# Patient Record
(demographics unavailable — no encounter records)

---

## 2025-07-23 NOTE — HEALTH RISK ASSESSMENT
[Good] : ~his/her~  mood as  good [No] : No [No falls in past year] : Patient reported no falls in the past year [0] : 2) Feeling down, depressed, or hopeless: Not at all (0) [Never] : Never [Patient reported mammogram was normal] : Patient reported mammogram was normal [With Significant Other] : lives with significant other [# of Members in Household ___] :  household currently consist of [unfilled] member(s) [Employed] : employed [Graduate School] : graduate school [] :  [# Of Children ___] : has [unfilled] children [Fully functional (bathing, dressing, toileting, transferring, walking, feeding)] : Fully functional (bathing, dressing, toileting, transferring, walking, feeding) [Fully functional (using the telephone, shopping, preparing meals, housekeeping, doing laundry, using] : Fully functional and needs no help or supervision to perform IADLs (using the telephone, shopping, preparing meals, housekeeping, doing laundry, using transportation, managing medications and managing finances) [Smoke Detector] : smoke detector [Carbon Monoxide Detector] : carbon monoxide detector [Seat Belt] :  uses seat belt [Sunscreen] : uses sunscreen [Patient reported PAP Smear was normal] : Patient reported PAP Smear was normal [Patient reported bone density results were normal] : Patient reported bone density results were normal [de-identified] : Does not exercise regularly [[id number]] : 0 [Change in mental status noted] : No change in mental status noted [Language] : denies difficulty with language [Behavior] : denies difficulty with behavior [Handling Complex Tasks] : denies difficulty handling complex tasks [Reasoning] : denies difficulty with reasoning [Reports changes in hearing] : Reports no changes in hearing [Reports changes in vision] : Reports no changes in vision [Reports changes in dental health] : Reports no changes in dental health [MammogramDate] : 7/30/2024 [PapSmearDate] : 2023 [BoneDensityDate] : 7/30/2024 [ColonoscopyDate] : never [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

## 2025-07-23 NOTE — HEALTH RISK ASSESSMENT
[Good] : ~his/her~  mood as  good [No] : No [No falls in past year] : Patient reported no falls in the past year [0] : 2) Feeling down, depressed, or hopeless: Not at all (0) [Never] : Never [Patient reported mammogram was normal] : Patient reported mammogram was normal [With Significant Other] : lives with significant other [# of Members in Household ___] :  household currently consist of [unfilled] member(s) [Employed] : employed [Graduate School] : graduate school [] :  [# Of Children ___] : has [unfilled] children [Fully functional (bathing, dressing, toileting, transferring, walking, feeding)] : Fully functional (bathing, dressing, toileting, transferring, walking, feeding) [Fully functional (using the telephone, shopping, preparing meals, housekeeping, doing laundry, using] : Fully functional and needs no help or supervision to perform IADLs (using the telephone, shopping, preparing meals, housekeeping, doing laundry, using transportation, managing medications and managing finances) [Smoke Detector] : smoke detector [Carbon Monoxide Detector] : carbon monoxide detector [Seat Belt] :  uses seat belt [Sunscreen] : uses sunscreen [Patient reported PAP Smear was normal] : Patient reported PAP Smear was normal [Patient reported bone density results were normal] : Patient reported bone density results were normal [de-identified] : Does not exercise regularly [[id number]] : 0 [Change in mental status noted] : No change in mental status noted [Language] : denies difficulty with language [Behavior] : denies difficulty with behavior [Handling Complex Tasks] : denies difficulty handling complex tasks [Reasoning] : denies difficulty with reasoning [Reports changes in hearing] : Reports no changes in hearing [Reports changes in vision] : Reports no changes in vision [Reports changes in dental health] : Reports no changes in dental health [MammogramDate] : 7/30/2024 [PapSmearDate] : 2023 [BoneDensityDate] : 7/30/2024 [ColonoscopyDate] : never [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

## 2025-07-23 NOTE — PHYSICAL EXAM
[Alert] : alert [Oriented x 3] : ~L oriented x 3 [Well Nourished] : well nourished [Conjunctiva Non-injected] : conjunctiva non-injected [No Visual Lymphadenopathy] : no visual  lymphadenopathy [No Clubbing] : no clubbing [No Edema] : no edema [No Bromhidrosis] : no bromhidrosis [No Chromhidrosis] : no chromhidrosis [FreeTextEntry3] : hypertrophic linear scarred plaque on chest

## 2025-07-23 NOTE — HISTORY OF PRESENT ILLNESS
[FreeTextEntry1] : The patient is a 58 year  old female who presents for annual physical examination.  [de-identified] : Patient is a 58-year-old female with past medical history significant for iron deficiency anemia, first-degree AV block, vitamin D deficiency, h/o axillary adenopathy presents for annual wellness exam. Patient reports that overall she has been feeling well.   She denies any symptoms of chest pain, shortness of breath, palpitations, dizziness, lightheadedness, or syncope. Patient's appetite is good and there have been no symptoms of nausea, vomiting, change in bowels, bright red blood per rectum, or melena.  She denies urinary frequency, urgency, dysuria, or hematuria. Patient requests forms for school and employment be completed.

## 2025-07-23 NOTE — DATA REVIEWED
[FreeTextEntry1] : EKG: Sinus bradycardia 56 bpm, first-degree AV block, no acute ST-T wave changes.

## 2025-07-23 NOTE — ASSESSMENT
[FreeTextEntry1] : # keloid scar, chest, improved but still active/flaring, not at treatment goal discussed nature, chronicity and unpredictable course Therapeutic options and their risks and benefits; along with multiple diagnostic possibilities were discussed at length; risks and benefits of further study were discussed pt opts for ilk today as below   ILK today treatment # 3 Intralesional injection of Kenalog, 20 mg/ml A total of 0.1  ml was injected.  The risks/benefits/alternatives of intralesional steroid injections were reviewed with the patient which include but are not limited to pain, atrophy, and dispygmentation. Intralesional injections were performed in the affected area. The patient tolerated the procedure well.   RTC PRN

## 2025-07-23 NOTE — HISTORY OF PRESENT ILLNESS
[FreeTextEntry1] : F/U: keloid chest [de-identified] : ANA CONTI is a 58 year year old female who presents for:  # keloid chest, improved s/p ILK at  5/2025 but then started to enlarge again. hx: some improvement after ILK x 2 in the past, now starting to regrow, itchy. last tx 2022  no personal or family h/o skin cancer

## 2025-07-23 NOTE — HISTORY OF PRESENT ILLNESS
[FreeTextEntry1] : The patient is a 58 year  old female who presents for annual physical examination.  [de-identified] : Patient is a 58-year-old female with past medical history significant for iron deficiency anemia, first-degree AV block, vitamin D deficiency, h/o axillary adenopathy presents for annual wellness exam. Patient reports that overall she has been feeling well.   She denies any symptoms of chest pain, shortness of breath, palpitations, dizziness, lightheadedness, or syncope. Patient's appetite is good and there have been no symptoms of nausea, vomiting, change in bowels, bright red blood per rectum, or melena.  She denies urinary frequency, urgency, dysuria, or hematuria. Patient requests forms for school and employment be completed.

## 2025-07-23 NOTE — HISTORY OF PRESENT ILLNESS
[FreeTextEntry1] : F/U: keloid chest [de-identified] : ANA CONTI is a 58 year year old female who presents for:  # keloid chest, improved s/p ILK at  5/2025 but then started to enlarge again. hx: some improvement after ILK x 2 in the past, now starting to regrow, itchy. last tx 2022  no personal or family h/o skin cancer

## 2025-07-23 NOTE — ASSESSMENT
[FreeTextEntry1] : Healthcare maintenance: Patient overdue for mammography and breast ultrasound.  Referrals provided. GYN exam scheduled for next month. Referral for GI consultation for screening colonoscopy provided. Routine labs drawn today. Age-appropriate preventive care counseling and Healthcare maintenance discussed including but not limited to proper diet, exercise, routine dental care, skin cancer screening, and eye care.